# Patient Record
Sex: MALE | Race: WHITE | NOT HISPANIC OR LATINO | Employment: OTHER | URBAN - METROPOLITAN AREA
[De-identification: names, ages, dates, MRNs, and addresses within clinical notes are randomized per-mention and may not be internally consistent; named-entity substitution may affect disease eponyms.]

---

## 2024-05-25 ENCOUNTER — OFFICE VISIT (OUTPATIENT)
Dept: URGENT CARE | Facility: PHYSICIAN GROUP | Age: 64
End: 2024-05-25
Payer: COMMERCIAL

## 2024-05-25 VITALS
HEIGHT: 70 IN | DIASTOLIC BLOOD PRESSURE: 80 MMHG | HEART RATE: 89 BPM | RESPIRATION RATE: 16 BRPM | WEIGHT: 262 LBS | OXYGEN SATURATION: 93 % | SYSTOLIC BLOOD PRESSURE: 140 MMHG | TEMPERATURE: 98.4 F | BODY MASS INDEX: 37.51 KG/M2

## 2024-05-25 DIAGNOSIS — G43.009 MIGRAINE WITHOUT AURA AND WITHOUT STATUS MIGRAINOSUS, NOT INTRACTABLE: ICD-10-CM

## 2024-05-25 PROBLEM — I10 HYPERTENSION: Status: ACTIVE | Noted: 2024-05-25

## 2024-05-25 PROBLEM — S43.431A SUPERIOR LABRUM ANTERIOR-TO-POSTERIOR (SLAP) TEAR OF RIGHT SHOULDER: Status: ACTIVE | Noted: 2024-05-25

## 2024-05-25 PROBLEM — M96.1 POSTLAMINECTOMY SYNDROME OF LUMBAR REGION: Status: ACTIVE | Noted: 2024-05-25

## 2024-05-25 PROBLEM — M75.100 TEAR OF ROTATOR CUFF: Status: ACTIVE | Noted: 2024-05-25

## 2024-05-25 PROBLEM — G89.29 CHRONIC BACK PAIN: Status: ACTIVE | Noted: 2018-09-04

## 2024-05-25 PROBLEM — K44.9 HIATAL HERNIA: Status: ACTIVE | Noted: 2019-01-09

## 2024-05-25 PROBLEM — M54.9 CHRONIC BACK PAIN: Status: ACTIVE | Noted: 2018-09-04

## 2024-05-25 PROBLEM — K21.9 GASTROESOPHAGEAL REFLUX DISEASE WITHOUT ESOPHAGITIS: Status: ACTIVE | Noted: 2024-05-25

## 2024-05-25 PROBLEM — G43.909 MIGRAINE HEADACHE: Chronic | Status: ACTIVE | Noted: 2019-01-09

## 2024-05-25 PROBLEM — S83.249A TEAR OF MEDIAL MENISCUS OF KNEE: Status: ACTIVE | Noted: 2024-05-25

## 2024-05-25 PROCEDURE — 3079F DIAST BP 80-89 MM HG: CPT | Performed by: NURSE PRACTITIONER

## 2024-05-25 PROCEDURE — 1125F AMNT PAIN NOTED PAIN PRSNT: CPT | Performed by: NURSE PRACTITIONER

## 2024-05-25 PROCEDURE — 99203 OFFICE O/P NEW LOW 30 MIN: CPT | Performed by: NURSE PRACTITIONER

## 2024-05-25 PROCEDURE — 3077F SYST BP >= 140 MM HG: CPT | Performed by: NURSE PRACTITIONER

## 2024-05-25 RX ORDER — SUMATRIPTAN 6 MG/.5ML
6 INJECTION, SOLUTION SUBCUTANEOUS ONCE
Status: COMPLETED | OUTPATIENT
Start: 2024-05-25 | End: 2024-05-25

## 2024-05-25 RX ORDER — CEFUROXIME AXETIL 250 MG/1
6 TABLET ORAL ONCE
Qty: 0.5 ML | Refills: 0 | Status: SHIPPED | OUTPATIENT
Start: 2024-05-25 | End: 2024-05-25

## 2024-05-25 RX ADMIN — SUMATRIPTAN 6 MG: 6 INJECTION, SOLUTION SUBCUTANEOUS at 15:59

## 2024-05-25 ASSESSMENT — ENCOUNTER SYMPTOMS
SORE THROAT: 0
PHOTOPHOBIA: 1
NAUSEA: 1
HEADACHES: 1
VOMITING: 0
TINGLING: 0
SENSORY CHANGE: 0
WEAKNESS: 0
FEVER: 0
HEMOPTYSIS: 0

## 2024-05-25 ASSESSMENT — PAIN SCALES - GENERAL: PAINLEVEL: 8=MODERATE-SEVERE PAIN

## 2024-05-25 NOTE — PATIENT INSTRUCTIONS
Follow up with Neurologist. Follow up emergently for neuro changes, vision changes, fever, neck stiffness, uncontrolled vomiting, syncope or near syncope, changes in speech, confusion, weakness, or facial droop.

## 2024-05-25 NOTE — PROGRESS NOTES
Subjective:     Hill Kincaid is a 63 y.o. male who presents for Migraine (X 1 day has history of migraines las 20 yrs )      Patient is visiting, and forgot his migraine medication at home, sumatriptian. Headache is similar to previous migraines, stating he has a 20 year history. Headache started today. He had been in a book store with florescent lighting. This is a known trigger. Recently started seeing a new neurologist, michael suggested he follow up on sleep study. Preventatives have not previously worked for him.     Headache      Past Medical History:   Diagnosis Date    Migraine        Past Surgical History:   Procedure Laterality Date    HAND SURGERY      HIP REPLACEMENT, TOTAL      KNEE REPLACEMENT, TOTAL      LUMBAR FUSION ANTERIOR         Social History     Socioeconomic History    Marital status:      Spouse name: Not on file    Number of children: Not on file    Years of education: Not on file    Highest education level: Not on file   Occupational History    Not on file   Tobacco Use    Smoking status: Never    Smokeless tobacco: Never   Vaping Use    Vaping status: Never Used   Substance and Sexual Activity    Alcohol use: Never    Drug use: Never    Sexual activity: Not on file   Other Topics Concern    Not on file   Social History Narrative    Not on file     Social Determinants of Health     Financial Resource Strain: Not on file   Food Insecurity: Not on file   Transportation Needs: Not on file   Physical Activity: Not on file   Stress: Not on file   Social Connections: Not on file   Intimate Partner Violence: Not on file   Housing Stability: Not on file        History reviewed. No pertinent family history.     No Known Allergies    Review of Systems   Constitutional:  Negative for fever.   HENT:  Negative for sore throat.    Eyes:  Positive for photophobia.   Respiratory:  Negative for hemoptysis.    Gastrointestinal:  Positive for nausea. Negative for vomiting.   Musculoskeletal:  Positive  "for joint pain.   Neurological:  Positive for headaches. Negative for tingling, sensory change and weakness.   All other systems reviewed and are negative.       Objective:   BP (!) 140/80 (BP Location: Right arm, Patient Position: Sitting, BP Cuff Size: Large adult)   Pulse 89   Temp 36.9 °C (98.4 °F) (Temporal)   Resp 16   Ht 1.778 m (5' 10\")   Wt 119 kg (262 lb)   SpO2 93%   BMI 37.59 kg/m²     Physical Exam  Constitutional:       General: He is not in acute distress.  Eyes:      Extraocular Movements: Extraocular movements intact.      Conjunctiva/sclera: Conjunctivae normal.      Pupils: Pupils are equal, round, and reactive to light.   Cardiovascular:      Rate and Rhythm: Normal rate.   Pulmonary:      Effort: Pulmonary effort is normal.   Skin:     General: Skin is warm and dry.   Neurological:      General: No focal deficit present.      Mental Status: He is alert and oriented to person, place, and time.      GCS: GCS eye subscore is 4. GCS verbal subscore is 5. GCS motor subscore is 6.      Cranial Nerves: No facial asymmetry.   Psychiatric:         Mood and Affect: Mood normal.         Behavior: Behavior normal.         Assessment/Plan:   1. Migraine without aura and without status migrainosus, not intractable  - SUMAtriptan Succinate (Imitrex) injection 6 mg  - SUMAtriptan Succinate 6 MG/0.5ML Solution Auto-injector; Inject 6 mg under the skin one time for 1 dose.  Dispense: 0.5 mL; Refill: 0    Follow up with Neurologist. Follow up emergently for neuro changes, vision changes, fever, neck stiffness, uncontrolled vomiting, syncope or near syncope, changes in speech, confusion, weakness, or facial droop.    Stable vitals. No new neuro deficits. History of similar headache. Patient had forgot his medication at home.     Differential diagnosis, natural history, supportive care, and indications for immediate follow-up discussed.  "